# Patient Record
(demographics unavailable — no encounter records)

---

## 2025-01-20 NOTE — HISTORY OF PRESENT ILLNESS
[FreeTextEntry1] : NPV: rash  [de-identified] : CHAPO AMES is a 62 year old who is presenting for evaluation of:   1. Spot on the R cheek  2. Rash intermittent, itchy, R buttocks, comes and goes   Skin Cancer Hx: none  FH of skin cancer: none

## 2025-01-20 NOTE — PHYSICAL EXAM
[FreeTextEntry3] : Focused skin exam performed  The relevant portions of the exam were performed today  AAOx3, NAD, well-appearing / pleasant Focused examination within normal limits with the exception of:  - grouped vesicles on an erythematous base  - stuck on waxy brown papules on R preauricular skin

## 2025-01-20 NOTE — HISTORY OF PRESENT ILLNESS
[FreeTextEntry1] : NPV: rash  [de-identified] : CHAPO AMES is a 62 year old who is presenting for evaluation of:   1. Spot on the R cheek  2. Rash intermittent, itchy, R buttocks, comes and goes   Skin Cancer Hx: none  FH of skin cancer: none

## 2025-01-20 NOTE — HISTORY OF PRESENT ILLNESS
[FreeTextEntry1] : NPV: rash  [de-identified] : CHAPO AMES is a 62 year old who is presenting for evaluation of:   1. Spot on the R cheek  2. Rash intermittent, itchy, R buttocks, comes and goes   Skin Cancer Hx: none  FH of skin cancer: none

## 2025-01-20 NOTE — ASSESSMENT
[FreeTextEntry1] : # Seborrheic Keratosis - Discussed with patient the benign nature of these growths and likelihood to develop further similar lesions  - Related to genetics - these lesions run in families - No treatment warranted unless inflamed   #Dermatitis, favor HSV  Acute, flaring  [ ] HSV swab performed today, will f/u - START valtrex 1g daily x5 days prn flares   RTC PRN

## 2025-03-06 NOTE — HISTORY OF PRESENT ILLNESS
[FreeTextEntry1] : cc ; f/u lipids,  sugar, colon Ca prevention ,weight  [de-identified] : Pt presented for f/u she denies CP,SOB,abd pain, no N,no V,no C. She declined Colon, she had ad one at 41 yo ( as per pt normal)  Occult blood 3 yrs ago ( as per pt  normal),pt declined referral for colonoscopy. Pt adjusted her diet , lost 1 lb

## 2025-03-06 NOTE — HEALTH RISK ASSESSMENT
[No] : In the past 12 months have you used drugs other than those required for medical reasons? No [de-identified] : none [Never] : Never

## 2025-03-06 NOTE — PHYSICAL EXAM
[No Acute Distress] : no acute distress [Normal Outer Ear/Nose] : the outer ears and nose were normal in appearance [Normal Oropharynx] : the oropharynx was normal [No Edema] : there was no peripheral edema [Normal] : no joint swelling and grossly normal strength and tone [No Rash] : no rash [No Focal Deficits] : no focal deficits [Normal Gait] : normal gait [Alert and Oriented x3] : oriented to person, place, and time

## 2025-07-21 NOTE — HISTORY OF PRESENT ILLNESS
[FreeTextEntry8] : Improving paroxysmal Productive cough with associated pnd for about 11 days.  Had right lower wisdom tooth removed on 07/08. Cough developed after.   NO sick contacts reported.

## 2025-07-21 NOTE — PHYSICAL EXAM
[No Acute Distress] : no acute distress [Well Nourished] : well nourished [Well Developed] : well developed [Well-Appearing] : well-appearing [Normal Sclera/Conjunctiva] : normal sclera/conjunctiva [PERRL] : pupils equal round and reactive to light [EOMI] : extraocular movements intact [Normal Outer Ear/Nose] : the outer ears and nose were normal in appearance [Normal Oropharynx] : the oropharynx was normal [No JVD] : no jugular venous distention [No Lymphadenopathy] : no lymphadenopathy [Supple] : supple [Thyroid Normal, No Nodules] : the thyroid was normal and there were no nodules present [No Respiratory Distress] : no respiratory distress  [No Accessory Muscle Use] : no accessory muscle use [Clear to Auscultation] : lungs were clear to auscultation bilaterally [Normal Rate] : normal rate  [Regular Rhythm] : with a regular rhythm [Normal S1, S2] : normal S1 and S2 [No Murmur] : no murmur heard [No Carotid Bruits] : no carotid bruits [No Abdominal Bruit] : a ~M bruit was not heard ~T in the abdomen [No Varicosities] : no varicosities [Pedal Pulses Present] : the pedal pulses are present [No Edema] : there was no peripheral edema [No Palpable Aorta] : no palpable aorta [No Extremity Clubbing/Cyanosis] : no extremity clubbing/cyanosis [Soft] : abdomen soft [Non Tender] : non-tender [Non-distended] : non-distended [No Masses] : no abdominal mass palpated [No HSM] : no HSM [Normal Bowel Sounds] : normal bowel sounds [Normal Posterior Cervical Nodes] : no posterior cervical lymphadenopathy [Normal Anterior Cervical Nodes] : no anterior cervical lymphadenopathy [No CVA Tenderness] : no CVA  tenderness [No Spinal Tenderness] : no spinal tenderness [No Joint Swelling] : no joint swelling [Grossly Normal Strength/Tone] : grossly normal strength/tone [No Rash] : no rash [Coordination Grossly Intact] : coordination grossly intact [No Focal Deficits] : no focal deficits [Normal Gait] : normal gait [Deep Tendon Reflexes (DTR)] : deep tendon reflexes were 2+ and symmetric [Normal Affect] : the affect was normal [Normal Insight/Judgement] : insight and judgment were intact [de-identified] : Slight injected posterior pharynx and nasal turbinates [de-identified] : Mostly clear chest

## 2025-07-21 NOTE — REVIEW OF SYSTEMS
[Postnasal Drip] : postnasal drip [Shortness Of Breath] : no shortness of breath [Cough] : cough [Negative] : Heme/Lymph

## 2025-07-21 NOTE — PLAN
[FreeTextEntry1] : High suspicion for upper respiratory infection Reassurance given Discussed that treatment is generally symptomatic relief including hydration, warm liquids, the utilization steam/humidifier, and  rest Can utilize OTCs.  For significant runny nose/nasal congestion generally recommend-antihistamine/Flonase For any slight chest congestion-trial Mucinex DM

## 2025-07-24 NOTE — PHYSICAL EXAM
[Chaperoned Physical Exam] : A chaperone was present in the examining room during all aspects of the physical examination. [Appropriately responsive] : appropriately responsive [Alert] : alert [No Acute Distress] : no acute distress [No Lymphadenopathy] : no lymphadenopathy [Regular Rate Rhythm] : regular rate rhythm [No Murmurs] : no murmurs [Clear to Auscultation B/L] : clear to auscultation bilaterally [Soft] : soft [Non-tender] : non-tender [Non-distended] : non-distended [No HSM] : No HSM [No Lesions] : no lesions [No Mass] : no mass [Oriented x3] : oriented x3 [Examination Of The Breasts] : a normal appearance [No Discharge] : no discharge [No Masses] : no breast masses were palpable [Labia Majora] : normal [Labia Minora] : normal [Normal] : normal [Discharge] : a  ~M vaginal discharge was present [Moderate] : moderate [Jarrod] : yellow [No Bleeding] : There was no active vaginal bleeding [Uterine Adnexae] : non-palpable [FreeTextEntry6] : Adnexal fullness

## 2025-07-24 NOTE — DISCUSSION/SUMMARY
[FreeTextEntry1] :  GYN Annual evaluation today -pap smear and vaginal cultures sent TVS done today for -- adnexal fullness and Fibroid Uterus. We discussed -- Vaginal discharge, Fibroid uterus and Adnexal fullness causes and treatment options. PLAN:  We discussed patient presents for a GYN annual physical. She reports a history of yellowish, thick discharge and occasional negative breath. A culture was sent for analysis. The patient mentions no significant changes in metabolism or weight. Review results of the vaginal culture. Discuss findings and potential treatment options based on culture results. Educate patient on maintaining proper vaginal hygiene. Annual mammography screening discussed. Patient aware of its importance. Schedule mammography appointment. Provide patient education on breast self-examination. Patient verbalized understanding of all explanations, and her questions were answered, and all concerns were addressed. Patient was screened for depression - no signs of clinical depression. PHQ-2 on file Rx given for mammogram and B sonogram RTO in 1 year for annual    I, Adarsh guevara acting as scribe for Dr. Canseco. 07/23/2025   The documentation recorded by the scribe, in my presence, accurately reflects the service I personally performed, and the decisions made by me with my edits as appropriate on 07/24/2025  Madison Canseco MD, FACOG   [id number] [facility]

## 2025-07-24 NOTE — HISTORY OF PRESENT ILLNESS
[FreeTextEntry1] : GYN Annual [TextBox_4] : 64 YO F patient presents for GYN annual check-up exam. Chief c/o: vaginal discharge